# Patient Record
Sex: FEMALE | Race: WHITE | URBAN - METROPOLITAN AREA
[De-identification: names, ages, dates, MRNs, and addresses within clinical notes are randomized per-mention and may not be internally consistent; named-entity substitution may affect disease eponyms.]

---

## 2020-08-03 ENCOUNTER — HISTORICAL (OUTPATIENT)
Dept: RESPIRATORY THERAPY | Facility: HOSPITAL | Age: 40
End: 2020-08-03

## 2020-12-14 ENCOUNTER — HOSPITAL ENCOUNTER (OUTPATIENT)
Dept: MEDSURG UNIT | Facility: HOSPITAL | Age: 40
End: 2020-12-15
Attending: SURGERY | Admitting: SURGERY

## 2020-12-14 LAB
ABS NEUT (OLG): 9.95 X10(3)/MCL (ref 2.1–9.2)
ALBUMIN SERPL-MCNC: 4.1 GM/DL (ref 3.5–5)
ALBUMIN/GLOB SERPL: 1.2 RATIO (ref 1.1–2)
ALP SERPL-CCNC: 86 UNIT/L (ref 40–150)
ALT SERPL-CCNC: 48 UNIT/L (ref 0–55)
APPEARANCE, UA: CLEAR
AST SERPL-CCNC: 86 UNIT/L (ref 5–34)
BACTERIA SPEC CULT: ABNORMAL /HPF
BASOPHILS # BLD AUTO: 0 X10(3)/MCL (ref 0–0.2)
BASOPHILS NFR BLD AUTO: 0 %
BILIRUB SERPL-MCNC: 0.5 MG/DL
BILIRUB UR QL STRIP: NEGATIVE
BILIRUBIN DIRECT+TOT PNL SERPL-MCNC: 0.2 MG/DL (ref 0–0.5)
BILIRUBIN DIRECT+TOT PNL SERPL-MCNC: 0.3 MG/DL (ref 0–0.8)
BUN SERPL-MCNC: 12.5 MG/DL (ref 7–18.7)
CALCIUM SERPL-MCNC: 8.6 MG/DL (ref 8.4–10.2)
CHLORIDE SERPL-SCNC: 106 MMOL/L (ref 98–107)
CO2 SERPL-SCNC: 23 MMOL/L (ref 22–29)
COLOR UR: YELLOW
CREAT SERPL-MCNC: 0.73 MG/DL (ref 0.55–1.02)
EOSINOPHIL # BLD AUTO: 0 X10(3)/MCL (ref 0–0.9)
EOSINOPHIL NFR BLD AUTO: 0 %
ERYTHROCYTE [DISTWIDTH] IN BLOOD BY AUTOMATED COUNT: 13 % (ref 11.5–17)
GLOBULIN SER-MCNC: 3.5 GM/DL (ref 2.4–3.5)
GLUCOSE (UA): NEGATIVE
GLUCOSE SERPL-MCNC: 101 MG/DL (ref 74–100)
HCT VFR BLD AUTO: 42.4 % (ref 37–47)
HGB BLD-MCNC: 13.3 GM/DL (ref 12–16)
HGB UR QL STRIP: ABNORMAL
KETONES UR QL STRIP: NEGATIVE
LEUKOCYTE ESTERASE UR QL STRIP: NEGATIVE
LIPASE SERPL-CCNC: 19 U/L
LYMPHOCYTES # BLD AUTO: 1.1 X10(3)/MCL (ref 0.6–4.6)
LYMPHOCYTES NFR BLD AUTO: 10 %
MCH RBC QN AUTO: 28.9 PG (ref 27–31)
MCHC RBC AUTO-ENTMCNC: 31.4 GM/DL (ref 33–36)
MCV RBC AUTO: 92 FL (ref 80–94)
MONOCYTES # BLD AUTO: 0.7 X10(3)/MCL (ref 0.1–1.3)
MONOCYTES NFR BLD AUTO: 6 %
NEUTROPHILS # BLD AUTO: 9.95 X10(3)/MCL (ref 2.1–9.2)
NEUTROPHILS NFR BLD AUTO: 84 %
NITRITE UR QL STRIP: NEGATIVE
PH UR STRIP: 8 [PH] (ref 5–9)
PLATELET # BLD AUTO: 228 X10(3)/MCL (ref 130–400)
PMV BLD AUTO: 10.2 FL (ref 9.4–12.4)
POC BETA-HCG (QUAL): NEGATIVE
POC TROPONIN: 0 NG/ML (ref 0–0.08)
POTASSIUM SERPL-SCNC: 4 MMOL/L (ref 3.5–5.1)
PROT SERPL-MCNC: 7.6 GM/DL (ref 6.4–8.3)
PROT UR QL STRIP: NEGATIVE
RBC # BLD AUTO: 4.61 X10(6)/MCL (ref 4.2–5.4)
RBC #/AREA URNS HPF: 16 /HPF (ref 0–2)
SODIUM SERPL-SCNC: 138 MMOL/L (ref 136–145)
SP GR UR STRIP: 1.03 (ref 1–1.03)
SQUAMOUS EPITHELIAL, UA: 6 /HPF (ref 0–4)
UROBILINOGEN UR STRIP-ACNC: 1
WBC # SPEC AUTO: 11.9 X10(3)/MCL (ref 4.5–11.5)
WBC #/AREA URNS HPF: ABNORMAL /[HPF]

## 2020-12-15 LAB
ABS NEUT (OLG): 5.09 X10(3)/MCL (ref 2.1–9.2)
ALBUMIN SERPL-MCNC: 3.1 GM/DL (ref 3.5–5)
ALBUMIN/GLOB SERPL: 1.1 RATIO (ref 1.1–2)
ALP SERPL-CCNC: 96 UNIT/L (ref 40–150)
ALT SERPL-CCNC: 239 UNIT/L (ref 0–55)
AST SERPL-CCNC: 223 UNIT/L (ref 5–34)
BASOPHILS # BLD AUTO: 0 X10(3)/MCL (ref 0–0.2)
BASOPHILS NFR BLD AUTO: 1 %
BILIRUB SERPL-MCNC: 0.5 MG/DL
BILIRUBIN DIRECT+TOT PNL SERPL-MCNC: 0.2 MG/DL (ref 0–0.5)
BILIRUBIN DIRECT+TOT PNL SERPL-MCNC: 0.3 MG/DL (ref 0–0.8)
BUN SERPL-MCNC: 9.7 MG/DL (ref 7–18.7)
CALCIUM SERPL-MCNC: 7.9 MG/DL (ref 8.4–10.2)
CHLORIDE SERPL-SCNC: 108 MMOL/L (ref 98–107)
CO2 SERPL-SCNC: 26 MMOL/L (ref 22–29)
CREAT SERPL-MCNC: 0.66 MG/DL (ref 0.55–1.02)
EOSINOPHIL # BLD AUTO: 0.1 X10(3)/MCL (ref 0–0.9)
EOSINOPHIL NFR BLD AUTO: 2 %
ERYTHROCYTE [DISTWIDTH] IN BLOOD BY AUTOMATED COUNT: 13.2 % (ref 11.5–17)
EST CREAT CLEARANCE SER (OHS): 121.3 ML/MIN
GLOBULIN SER-MCNC: 2.7 GM/DL (ref 2.4–3.5)
GLUCOSE SERPL-MCNC: 100 MG/DL (ref 74–100)
HCT VFR BLD AUTO: 37.9 % (ref 37–47)
HGB BLD-MCNC: 12.1 GM/DL (ref 12–16)
LYMPHOCYTES # BLD AUTO: 1.5 X10(3)/MCL (ref 0.6–4.6)
LYMPHOCYTES NFR BLD AUTO: 20 %
MCH RBC QN AUTO: 28.9 PG (ref 27–31)
MCHC RBC AUTO-ENTMCNC: 31.9 GM/DL (ref 33–36)
MCV RBC AUTO: 90.7 FL (ref 80–94)
MONOCYTES # BLD AUTO: 0.6 X10(3)/MCL (ref 0.1–1.3)
MONOCYTES NFR BLD AUTO: 8 %
NEUTROPHILS # BLD AUTO: 5.09 X10(3)/MCL (ref 2.1–9.2)
NEUTROPHILS NFR BLD AUTO: 69 %
PLATELET # BLD AUTO: 202 X10(3)/MCL (ref 130–400)
PMV BLD AUTO: 10.1 FL (ref 9.4–12.4)
POTASSIUM SERPL-SCNC: 3.8 MMOL/L (ref 3.5–5.1)
PROT SERPL-MCNC: 5.8 GM/DL (ref 6.4–8.3)
RBC # BLD AUTO: 4.18 X10(6)/MCL (ref 4.2–5.4)
SODIUM SERPL-SCNC: 138 MMOL/L (ref 136–145)
WBC # SPEC AUTO: 7.4 X10(3)/MCL (ref 4.5–11.5)

## 2022-04-10 ENCOUNTER — HISTORICAL (OUTPATIENT)
Dept: ADMINISTRATIVE | Facility: HOSPITAL | Age: 42
End: 2022-04-10

## 2022-04-26 VITALS
HEIGHT: 70 IN | DIASTOLIC BLOOD PRESSURE: 87 MMHG | WEIGHT: 216.06 LBS | SYSTOLIC BLOOD PRESSURE: 128 MMHG | BODY MASS INDEX: 30.93 KG/M2

## 2022-04-30 NOTE — OP NOTE
DATE OF SURGERY:    12/15/2020    SURGEON:  Elías Naranjo MD  ASSISTANT:  Ora Swift    PROCEDURE:  Laparoscopic cholecystectomy.    PREOPERATIVE DIAGNOSES:    1. Acute cholecystitis.   2. Cholelithiasis.    POSTOPERATIVE DIAGNOSES:    1. Acute cholecystitis.   2. Cholelithiasis.    ANESTHESIA:  General endotracheal anesthesia with local at sites.    COMPLICATIONS:  None.    ESTIMATED BLOOD LOSS:  25.    CONDITION:  Good.    SPECIMEN:  Gallbladder.    INDICATION FOR PROCEDURE:  This is a 40-year-old female with a couple days' history of abdominal and back pain with signs of gallstones and cholecystitis on ultrasound.  She had a history of a laparoscopic gastric band as well incidentally.  We are taking her to the operating room for laparoscopic cholecystectomy.    FINDINGS OF PROCEDURE:  Inflamed, friable, edematous gallbladder with otherwise normal biliary anatomy.  She did have the gastric band for which the reservoir was in the left mid abdomen, and no apparent abnormalities otherwise were noted with the pump or the band.    PROCEDURE IN DETAIL:  The patient was brought to the operating room.  She was brought under general endotracheal anesthesia.  She was prepped and draped in sterile fashion.  Antibiotics were given.  SCDs were in place.  Time-out was called.  Supraumbilical Veress access of the abdomen was performed with the water drop test.  There was no evidence of bowel or trocar injury.  The abdomen was insufflated to 15 mmHg.  A total of three 5 mm ports and a subxiphoid 11 mm port were placed.  The gallbladder was very edematous and friable.  The gastric band reservoir was noted in the left mid abdomen.  We were far away from the reservoir.  The tubing from the reservoir was extending towards the upper stomach.  There were no other abnormalities noted with that.  The triangle of Calot was easily dissected out.  There was an obvious cystic duct and cystic artery.  The cystic duct was clipped  3 times proximally, once distally and transected.  The cystic artery was clipped twice proximally, once distally and transected.  The gallbladder was removed from the gallbladder bed with Bovie cautery.  The gallbladder was removed from the abdomen with the EndoCatch bag.  The liver bed was hemostatic.  Lap, sponge, needle, and instrument counts were correct.  The clips were intact.  The abdomen was inspected.  There were no other abnormalities noted.  The abdomen was desufflated.  The port sites were removed.  The port sites were closed with 4-0 Vicryl.  The subxiphoid port was closed with 0 Vicryl in the fascia.  The patient tolerated the procedure well, was awoken without difficulty, and brought to recovery without further event.        ______________________________  MD RADHIKA Whitt/  DD:  12/15/2020  Time:  11:02AM  DT:  12/15/2020  Time:  11:17AM  Job #:  [id number]

## 2022-04-30 NOTE — ED PROVIDER NOTES
Patient:   Elizabet Jasso             MRN: [id number]            FIN: [id number]               Age:   40 years     Sex:  Female     :  1980   Associated Diagnoses:   Choledocholithiasis   Author:   Renée Tran      Basic Information   Time seen: Date & time 2020 11:36:00.   History source: Patient.   Arrival mode: Private vehicle.   History limitation: None.   Provider/Visit info:    No qualifying data available.   .   History of Present Illness   The patient presents with 41Y/O F presents to the ED with chest pain that radiates to back. Onset 2AM.Vonnie ADKINS-APARNA and OTONIEL FLORES, assume care of the patient. 41 yo female presents with right upper abdominal pain radiating to her back. Patient reports chest pain with SOB. Denies any nausea or vomiting. Seen at  and sent here to r/o cholecystitis. .  The onset was 5  hours ago.  The course/duration of symptoms is constant.  Location: Right epigastric. Radiating pain: both sides of the back.  upper back.  left upper quadrant of the abdomen. The character of symptoms is sharp.  The degree at maximum was severe.  The degree at present is severe.  The exacerbating factor is none.  The relieving factor is none.  Risk factors consist of coronary artery disease.  Prior episodes: none.  Associated symptoms: denies shortness of breath, denies nausea and denies vomiting.        Review of Systems   Constitutional symptoms:  No fever, no chills, no weakness, no fatigue.    Respiratory symptoms:  Shortness of breath, No cough,    Cardiovascular symptoms:  Chest pain, no palpitations, no peripheral edema.    Gastrointestinal symptoms:  Abdominal pain, no nausea, no vomiting, no diarrhea, no constipation.    Genitourinary symptoms:  No dysuria, no hematuria, no vaginal bleeding, no vaginal discharge.    Musculoskeletal symptoms:  No back pain, no Joint pain.    Neurologic symptoms:  No headache,              Additional review of systems information: All  other systems reviewed and otherwise negative.      Health Status   Allergies:    Allergic Reactions (Selected)  Severity Not Documented  Macrobid- Itch.  Morphine- Itch.  TraMADol- Anaphylactic reaction.,    Allergies (3) Active Reaction  Macrobid Itch  morphine Itch  traMADol Anaphylactic reaction  .   Medications:  (Selected)   Documented Medications  Documented  Adderall XR 30 mg oral capsule, extended release: 30 mg = 1 cap(s), Oral, Daily, 0 Refill(s)  magnesium gluconate 250 mg oral tablet: 250 mg = 1 tab(s), Oral, BID, # 60 tab(s), 0 Refill(s).      Past Medical/ Family/ Social History   Medical history:    No active or resolved past medical history items have been selected or recorded..   Surgical history:    LAP BAND Sx status.  Breast Augmentation.  .  Hx of Ilia Tubal Ligation.  Hx of Hysterectomy..   Family history:    Entire family history is negative..   Social history:    Social & Psychosocial Habits    Tobacco  2020  Use: Former smoker, quit more    Patient Wants Consult For Cessation Counseling No    2020  Use: Smoker, current status un    Patient Wants Consult For Cessation Counseling No    Comment: Does when social or under alot of stress - 2020 13:48 - Guerline Norton    2020  Use: Former smoker, quit more    Patient Wants Consult For Cessation Counseling N/A    Abuse/Neglect  2020  SHX Any signs of abuse or neglect No    2020  SHX Any signs of abuse or neglect No    Feels unsafe at home: No    Safe place to go: Yes    2020  SHX Any signs of abuse or neglect No  .      Physical Examination               Vital Signs   Vital Signs   2020 11:34 CST     Temperature Temporal Artery               36.6 DegC                             Peripheral Pulse Rate     84 bpm                             Respiratory Rate          22 br/min                             SpO2                      100 %                             Oxygen Therapy            Room air                              Systolic Blood Pressure   120 mmHg                             Diastolic Blood Pressure  86 mmHg  .      No qualifying data available.   General:  Alert, no acute distress, non-toxic.    Skin:  Warm, dry, pink.    Head:  Normocephalic, atraumatic.    Neck:  Supple, trachea midline.    Eye:  Pupils are equal, round and reactive to light, normal conjunctiva, vision grossly normal.    Cardiovascular:  Regular rate and rhythm, No murmur, No edema.    Respiratory:  Lungs are clear to auscultation, respirations are non-labored, breath sounds are equal.    Gastrointestinal:  Soft, Non distended, Tenderness: Moderate, right upper quadrant, left upper quadrant, Guarding: Negative, Rebound: Negative, Bowel sounds: Normal, Signs: Goel's.    Musculoskeletal:  Normal ROM, normal strength, no tenderness, no swelling.    Neurological:  Alert and oriented to person, place, time, and situation, No focal neurological deficit observed, normal sensory observed, normal motor observed.    Psychiatric:  Cooperative, appropriate mood & affect.       Medical Decision Making   Differential Diagnosis:  Pneumonia, costochondritis, chest wall pain, biliary disease.    Documents reviewed:  Emergency department nurses' notes.   Orders  Launch Orders   Laboratory:  UA with Reflex (Order): Stat collect, Urine, 12/14/2020 11:37 CST, Nurse collect, Print Label By Order Location  POC iSTAT ER Troponin request (Order): Blood, Stat collect, 12/14/2020 11:37 CST by Teresa Gaffney Lab Collect, Print Label By Order Location  Lipase Level (Order): Stat collect, 12/14/2020 11:37 CST, Blood, Lab Collect, Print Label By Order Location, 12/14/2020 11:37 CST  POC UPT ED (Order): Urine, Stat collect, Collected, 12/14/2020 11:37 CST by Teresa Gaffney Nurse collect, Print Label By Order Location  CMP (Order): Stat collect, 12/14/2020 11:37 CST, Blood, Lab Collect, Print Label By Order Location, 12/14/2020 11:37  CST  CBC w/ Auto Diff (Order): Now collect, 12/14/2020 11:37 CST, Blood, Lab Collect, Print Label By Order Location, 12/14/2020 11:37 CST  Pharmacy:  Zofran 2 mg/mL injectable solution (Order): 4 mg, form: Injection, IV Push, Once, first dose 12/14/2020 11:37 CST, stop date 12/14/2020 11:37 CST, STAT  Sodium Chloride 0.9% intravenous solution (Normal Saline (0.9% NS) IV) (Order): 1,000 mL, 1,000 mL, IV, Once, 1,000 mL/hr, start date 12/14/2020 11:37 CST, stop date 12/14/2020 11:37 CST  Cardiology:  EKG (Order): 12/14/2020 11:37 CST, Stat, Once, 12/14/2020 11:37 CST, -1, -1, 12/14/2020 11:37 CST.   Electrocardiogram:  Time 12/14/2020 11:34:00, rate 78, normal sinus rhythm, No ST-T changes, Ectopy None, P wave and MI interval WNL, QT interval WNL, QRS interval WNL, Interpretation by Emergency Physician Within normal limits.    Results review:  Lab results : Lab View   12/14/2020 12:43 CST     Est Creat Clearance Ser   109.67 mL/min    12/14/2020 12:26 CST     U beta hCG Ql POC         Negative    12/14/2020 11:57 CST     POC Troponin              0.00 ng/mL    12/14/2020 11:52 CST     Sodium Lvl                138 mmol/L                             Potassium Lvl             4.0 mmol/L                             Chloride                  106 mmol/L                             CO2                       23 mmol/L                             Calcium Lvl               8.6 mg/dL                             Glucose Lvl               101 mg/dL  HI                             BUN                       12.5 mg/dL                             Creatinine                0.73 mg/dL                             eGFR-AA                   >60  NA                             eGFR-DUC                  >60 mL/min/1.73 m2  NA                             Bili Total                0.5 mg/dL                             Bili Direct               0.2 mg/dL                             Bili Indirect             0.30 mg/dL                              AST                       86 unit/L  HI                             ALT                       48 unit/L                             Alk Phos                  86 unit/L                             Total Protein             7.6 gm/dL                             Albumin Lvl               4.1 gm/dL                             Globulin                  3.5 gm/dL                             A/G Ratio                 1.2 ratio                             Lipase Lvl                19 U/L                             WBC                       11.9 x10(3)/mcL  HI                             RBC                       4.61 x10(6)/mcL                             Hgb                       13.3 gm/dL                             Hct                       42.4 %                             Platelet                  228 x10(3)/mcL                             MCV                       92.0 fL                             MCH                       28.9 pg                             MCHC                      31.4 gm/dL  LOW                             RDW                       13.0 %                             MPV                       10.2 fL                             Abs Neut                  9.95 x10(3)/mcL  HI                             Neutro Auto               84 %  NA                             Lymph Auto                10 %  NA                             Mono Auto                 6 %  NA                             Eos Auto                  0 %  NA                             Abs Eos                   0.0 x10(3)/mcL                             Basophil Auto             0 %  NA                             Abs Neutro                9.95 x10(3)/mcL  HI                             Abs Lymph                 1.1 x10(3)/mcL                             Abs Mono                  0.7 x10(3)/mcL                             Abs Baso                  0.0 x10(3)/mcL    12/14/2020 11:40 CST     UA Appear                 CLEAR                              UA Color                  YELLOW                             UA Spec Grav              1.029                             UA Bili                   Negative                             UA pH                     8.0                             UA Urobilinogen           1.0                             UA Blood                  1+                             UA Glucose                Negative                             UA Ketones                Negative                             UA Protein                Negative                             UA Nitrite                Negative                             UA Leuk Est               Negative                             UA WBC                    NONE SEEN                             UA RBC                    16 /HPF  HI                             UA Bacteria               NONE SEEN /HPF                             UA Squam Epithelial       6 /HPF  HI    .   Radiology results:  Rad Results (ST)  < 12 hrs   Accession: [id number]  Order: US Abdomen Limited  Report Dt/Tm: 12/14/2020 15:09  Report:   TECHNIQUE: Gray-scale and color Doppler images of the abdomen.     HISTORY: RUQ Pain     COMPARISON: No relevant comparison studies available at the time of  dictation.      FINDINGS: The liver measures 14.7 cm and is unremarkable. The portal  vein demonstrates hepatopedal flow. There is no intra or extrahepatic  bile duct dilatation. The common bile duct measures 6 mm.     The gallbladder demonstrates a stone in its neck measuring 2.2 cm. The  gallbladder is distended but otherwise unremarkable. There is no  gallbladder wall thickening or pericholecystic fluid. There is no  sonographic Goel's sign.      The visualized portions of the pancreas and IVC are unremarkable.      The right kidney measures 11.9 cm and is unremarkable. There is no  right hydronephrosis.     There is no free fluid in the visualized abdomen.        IMPRESSION:     1.  Cholelithiasis.    Accession: [id number]  Order: XR Chest 2 Views  Report Dt/Tm: 12/14/2020 13:29  Report:   XR Chest 2 Views     HISTORY: Chest Pain     COMPARISON: None.     FINDINGS:     No acute displaced fractures or dislocations.  Joint spaces preserved.  No blastic or lytic lesions.  Soft tissues within normal limits.     IMPRESSION:     No acute osseous abnormality.      .   Notes:  Discussed case with ER attending, Dr. Roberts. He will have face to face encounter with patient. Discussed case with surgery who evaluated at bedside. Will admit for gallbladder removal tomorrow. Patient agreeable with admission..      Reexamination/ Reevaluation   Course: improving.   Pain status: decreased.   Notes: Pain improving. Labs unremarkable. US showing stone in duct with dilation. Surgery evaluated and will admit for surgery. .      Impression and Plan   Diagnosis   Choledocholithiasis (RXF68-CB K80.50)   Plan   Disposition: Admit time  12/14/2020 17:08:00, Admit to Surgery, Cassie TA MD, José Luis CAMPOS       Addendum      Teaching-Supervisory Addendum-Brief   I participated in the following activities of this patients care: the medical history, the physical exam, medical decision making.   I personally performed: the medical history, the physical exam.   The case was discussed with: the nurse practitioner.   Evaluation and management service: I agree with the evaluation and management decisions made in this patient's care.   Results interpretation: I agree with the study interpretation in this patient's care.   Notes: 40-year-old female presents with right upper quadrant pain.  The pains been ongoing since onset severe nature associated with nausea and vomiting.  Patient was fully worked up found to have acute cholecystitis.  Patient is being admitted at this time..

## 2022-04-30 NOTE — DISCHARGE SUMMARY
Patient:   Elizabet Jasso             MRN: [id number]            FIN: [id number]               Age:   40 years     Sex:  Female     :  1980   Associated Diagnoses:   None   Author:   Judd Dennis MD      Discharge Summary     Admit Date: 20     Discharge date: 12/15/20     Admitting Physician: Dr. Mcgrath     Discharge Physician: Dr. Naranjo     Admission Diagnosis: Acute Cholecystitis     Discharge diagnosis: Acute Cholecystitis s/p lap jacque     Admission Condition: stable     Discharge condition: stable     Hospital Course: Elizabet Jasso is a 41yo F who was admitted to the hospital with acute cholecystitis on . She was treated with antibiotics and on 12/15 she underwent laparoscopic cholecystectomy. She tolerated the procedure well and was discharged home on 12/15/20 with pain meds and zofran.     Consults: None     Diagnostic Studies: RUQ US. CT scan     Treatments: Surgery (see above), antibiotics, IVMF     Disposition: To home/self care     Activity: As tolerated     Diet: Ad aren     Wound Care: None     Follow-up: As needed     Plan discussed with patient and all questions answered.

## 2022-05-03 NOTE — HISTORICAL [facility] CERNER
This is a historical note converted from [employer]. Formatting and pictures may have been removed.  Please reference [employer]ania for original formatting and attached multimedia. Elizabet Jasso is a 41 yo F with a PMH of pericardial cyst, antiphospholipid syndrome, ADHD, and sinus thrombosis (while taking Viktoria birth control pill) who presents with sharp RUQ pain since 2am this morning. The pain is constant and radiates into her back. She hasnt eaten all day out of fear of the pain. She hasnt ever experienced pain like this before. Denies fevers, chills, scleral icterus, or acholic stools.  ?  PMH: Antiphospholipid syndrome, Sinus thrombosis, Pericardial cyst  Meds: Adderall  All: Macrobid, morphine, and tramadol  Surg Hx: Hysterectomy. Gastric band. Breast augmentation.  ?  Labs Last 48 hours?  ?Chemistry? Hematology/Coagulation?   Sodium Lvl: 138 mmol/L (12/14/20 11:52:00) WBC:?11.9 x10(3)/mcL?High (12/14/20 11:52:00)   Potassium Lvl: 4 mmol/L (12/14/20 11:52:00) RBC: 4.61 x10(6)/mcL (12/14/20 11:52:00)   Chloride: 106 mmol/L (12/14/20 11:52:00) Hgb: 13.3 gm/dL (12/14/20 11:52:00)   CO2: 23 mmol/L (12/14/20 11:52:00) Hct: 42.4 % (12/14/20 11:52:00)   Calcium Lvl: 8.6 mg/dL (12/14/20 11:52:00) Platelet: 228 x10(3)/mcL (12/14/20 11:52:00)   Glucose Lvl:?101 mg/dL?High (12/14/20 11:52:00) MCV: 92 fL (12/14/20 11:52:00)   BUN: 12.5 mg/dL (12/14/20 11:52:00) MCH: 28.9 pg (12/14/20 11:52:00)   Creatinine: 0.73 mg/dL (12/14/20 11:52:00) MCHC:?31.4 gm/dL?Low (12/14/20 11:52:00)   Est Creat Clearance Ser: 109.67 mL/min (12/14/20 12:43:52) RDW: 13 % (12/14/20 11:52:00)   eGFR-AA: >60 (12/14/20 11:52:00) MPV: 10.2 fL (12/14/20 11:52:00)   eGFR-DUC: >60 (12/14/20 11:52:00) Abs Neut:?9.95 x10(3)/mcL?High (12/14/20 11:52:00)   Bili Total: 0.5 mg/dL (12/14/20 11:52:00) Neutro Auto: 84 % (12/14/20 11:52:00)   Bili Direct: 0.2 mg/dL (12/14/20 11:52:00) Lymph Auto: 10 % (12/14/20 11:52:00)   Bili Indirect: 0.3 mg/dL (12/14/20 11:52:00) Mono  Auto: 6 % (12/14/20 11:52:00)   AST:?86 unit/L?High (12/14/20 11:52:00) Eos Auto: 0 % (12/14/20 11:52:00)   ALT: 48 unit/L (12/14/20 11:52:00) Abs Eos: 0 x10(3)/mcL (12/14/20 11:52:00)   Alk Phos: 86 unit/L (12/14/20 11:52:00) Basophil Auto: 0 % (12/14/20 11:52:00)   Total Protein: 7.6 gm/dL (12/14/20 11:52:00) Abs Neutro:?9.95 x10(3)/mcL?High (12/14/20 11:52:00)   Albumin Lvl: 4.1 gm/dL (12/14/20 11:52:00) Abs Lymph: 1.1 x10(3)/mcL (12/14/20 11:52:00)   Globulin: 3.5 gm/dL (12/14/20 11:52:00) Abs Mono: 0.7 x10(3)/mcL (12/14/20 11:52:00)   A/G Ratio: 1.2 ratio (12/14/20 11:52:00) Abs Baso: 0 x10(3)/mcL (12/14/20 11:52:00)   Lipase Lvl: 19 U/L (12/14/20 11:52:00)    POC Troponin: 0 ng/mL (12/14/20 11:57:00)    ?  Accession:?[id number]  Order:?US Abdomen Limited  Report Dt/Tm:?12/14/2020 15:09  Report:?  TECHNIQUE: Gray-scale and color Doppler images of the abdomen.  ?  HISTORY: RUQ Pain  ?  COMPARISON: No relevant comparison studies available at the time of  dictation.?  ?  FINDINGS: The liver measures 14.7 cm and is unremarkable. The portal  vein demonstrates hepatopedal flow. There is no intra or extrahepatic  bile duct dilatation. The common bile duct measures 6 mm.  ?  The gallbladder demonstrates a stone in its neck measuring 2.2 cm. The  gallbladder is distended but otherwise unremarkable. There is no  gallbladder wall thickening or pericholecystic fluid. There is no  sonographic [place] sign.?  ?  The visualized portions of the pancreas and IVC are unremarkable.?  ?  The right kidney measures 11.9 cm and is unremarkable. There is no  right hydronephrosis.  ?  There is no free fluid in the visualized abdomen.  ?  ?  IMPRESSION:  ?  1. Cholelithiasis.  ?  Accession:?[id number]  Order:?XR Chest 2 Views  Report Dt/Tm:?12/14/2020 13:29  Report:?  XR Chest 2 Views  ?  HISTORY: Chest Pain  ?  COMPARISON: None.  ?  FINDINGS:  ?  No acute displaced fractures or dislocations.  Joint spaces preserved.  No blastic or  lytic lesions.  Soft tissues within normal limits.  ?  IMPRESSION:  ?  No acute osseous abnormality.  ?  A/P: 39 yo F who presents to the hospital with RUQ pain and a?2.2cm gallstone lodged at the neck of the gallbladder.?Afebrile. White count 11.9  ?  - Will admit patient to surgical hospitalist  - OR tomorrow for lap cholecystectomy  - Consent obtained  - NPO at midnight. 2g Cefoxitin OCOR  - Lovenox for?DVT prophy  ?  Judd Dennis MD  General Surgery [facility]   Agree with above assessment and plan. Patient seen and evaluated with team.  Admit. NPO@MN. OR in AM for cholecystectomy.